# Patient Record
Sex: MALE | RURAL
[De-identification: names, ages, dates, MRNs, and addresses within clinical notes are randomized per-mention and may not be internally consistent; named-entity substitution may affect disease eponyms.]

---

## 2019-01-11 ENCOUNTER — APPOINTMENT (RX ONLY)
Dept: RURAL CLINIC 3 | Facility: CLINIC | Age: 74
Setting detail: DERMATOLOGY
End: 2019-01-11

## 2019-01-11 DIAGNOSIS — L82.1 OTHER SEBORRHEIC KERATOSIS: ICD-10-CM

## 2019-01-11 DIAGNOSIS — L85.3 XEROSIS CUTIS: ICD-10-CM

## 2019-01-11 DIAGNOSIS — L81.4 OTHER MELANIN HYPERPIGMENTATION: ICD-10-CM

## 2019-01-11 DIAGNOSIS — D22 MELANOCYTIC NEVI: ICD-10-CM

## 2019-01-11 DIAGNOSIS — L57.8 OTHER SKIN CHANGES DUE TO CHRONIC EXPOSURE TO NONIONIZING RADIATION: ICD-10-CM

## 2019-01-11 DIAGNOSIS — L57.0 ACTINIC KERATOSIS: ICD-10-CM

## 2019-01-11 PROBLEM — D22.9 MELANOCYTIC NEVI, UNSPECIFIED: Status: ACTIVE | Noted: 2019-01-11

## 2019-01-11 PROCEDURE — 17003 DESTRUCT PREMALG LES 2-14: CPT

## 2019-01-11 PROCEDURE — ? EDUCATIONAL RESOURCES PROVIDED

## 2019-01-11 PROCEDURE — 17000 DESTRUCT PREMALG LESION: CPT

## 2019-01-11 PROCEDURE — ? LIQUID NITROGEN

## 2019-01-11 PROCEDURE — 99202 OFFICE O/P NEW SF 15 MIN: CPT | Mod: 25

## 2019-01-11 PROCEDURE — ? COUNSELING

## 2019-01-11 ASSESSMENT — LOCATION DETAILED DESCRIPTION DERM
LOCATION DETAILED: LEFT DISTAL DORSAL FOREARM
LOCATION DETAILED: LEFT LATERAL MALAR CHEEK
LOCATION DETAILED: LEFT CENTRAL TEMPLE
LOCATION DETAILED: RIGHT DISTAL DORSAL FOREARM
LOCATION DETAILED: RIGHT MID TEMPLE
LOCATION DETAILED: RIGHT MID PREAURICULAR CHEEK

## 2019-01-11 ASSESSMENT — LOCATION SIMPLE DESCRIPTION DERM
LOCATION SIMPLE: RIGHT TEMPLE
LOCATION SIMPLE: LEFT FOREARM
LOCATION SIMPLE: RIGHT CHEEK
LOCATION SIMPLE: LEFT TEMPLE
LOCATION SIMPLE: RIGHT FOREARM
LOCATION SIMPLE: LEFT CHEEK

## 2019-01-11 ASSESSMENT — LOCATION ZONE DERM
LOCATION ZONE: FACE
LOCATION ZONE: ARM

## 2019-01-11 NOTE — PROCEDURE: LIQUID NITROGEN
Duration Of Freeze Thaw-Cycle (Seconds): 30
Total Number Of Aks Treated: 6
Aperture Size (Optional): C
Consent: The patient's consent was obtained including but not limited to risks of crusting, scabbing, blistering, scarring, darker or lighter pigmentary change, recurrence, incomplete removal and infection.
Detail Level: Zone
Render Post-Care Instructions In Note?: no
Number Of Freeze-Thaw Cycles: 1 freeze-thaw cycle
Post-Care Instructions: I reviewed with the patient in detail post-care instructions. Patient is to wear sunprotection, and avoid picking at any of the treated lesions. Pt may apply Vaseline to crusted or scabbing areas.

## 2019-01-11 NOTE — PROCEDURE: REASSURANCE
Additional Notes (Optional): DTO'S REGARDING FURTHER CARE
Detail Level: Detailed
Hide Additional Notes?: No
Include Location In Plan?: Yes
Detail Level: Generalized
Additional Notes (Optional): REASSURANCE REGARDING FURTHER CARE

## 2021-12-17 ENCOUNTER — APPOINTMENT (RX ONLY)
Dept: RURAL CLINIC 3 | Facility: CLINIC | Age: 76
Setting detail: DERMATOLOGY
End: 2021-12-17

## 2021-12-17 DIAGNOSIS — D22 MELANOCYTIC NEVI: ICD-10-CM

## 2021-12-17 DIAGNOSIS — D485 NEOPLASM OF UNCERTAIN BEHAVIOR OF SKIN: ICD-10-CM

## 2021-12-17 DIAGNOSIS — L82.1 OTHER SEBORRHEIC KERATOSIS: ICD-10-CM

## 2021-12-17 PROBLEM — D48.5 NEOPLASM OF UNCERTAIN BEHAVIOR OF SKIN: Status: ACTIVE | Noted: 2021-12-17

## 2021-12-17 PROBLEM — D22.5 MELANOCYTIC NEVI OF TRUNK: Status: ACTIVE | Noted: 2021-12-17

## 2021-12-17 PROCEDURE — 99212 OFFICE O/P EST SF 10 MIN: CPT | Mod: 25

## 2021-12-17 PROCEDURE — ? BIOPSY BY SHAVE METHOD

## 2021-12-17 PROCEDURE — ? EDUCATIONAL RESOURCES PROVIDED

## 2021-12-17 PROCEDURE — 11102 TANGNTL BX SKIN SINGLE LES: CPT

## 2021-12-17 PROCEDURE — ? COUNSELING

## 2021-12-17 ASSESSMENT — LOCATION ZONE DERM
LOCATION ZONE: TRUNK
LOCATION ZONE: ARM
LOCATION ZONE: FACE

## 2021-12-17 ASSESSMENT — LOCATION SIMPLE DESCRIPTION DERM
LOCATION SIMPLE: RIGHT CHEEK
LOCATION SIMPLE: LEFT FOREHEAD
LOCATION SIMPLE: RIGHT FOREARM
LOCATION SIMPLE: UPPER BACK
LOCATION SIMPLE: LEFT FOREARM
LOCATION SIMPLE: CHEST
LOCATION SIMPLE: LEFT UPPER BACK

## 2021-12-17 ASSESSMENT — LOCATION DETAILED DESCRIPTION DERM
LOCATION DETAILED: LEFT DISTAL DORSAL FOREARM
LOCATION DETAILED: MIDDLE STERNUM
LOCATION DETAILED: SUPERIOR THORACIC SPINE
LOCATION DETAILED: LEFT SUPERIOR MEDIAL UPPER BACK
LOCATION DETAILED: LEFT INFERIOR LATERAL FOREHEAD
LOCATION DETAILED: RIGHT SUPERIOR PREAURICULAR CHEEK
LOCATION DETAILED: RIGHT DISTAL DORSAL FOREARM

## 2021-12-17 NOTE — PROCEDURE: BIOPSY BY SHAVE METHOD
Detail Level: Detailed
Depth Of Biopsy: dermis
Was A Bandage Applied: Yes
Size Of Lesion In Cm: 0.9
X Size Of Lesion In Cm: 0
Biopsy Type: H and E
Biopsy Method: Personna blade
Anesthesia Type: 1% lidocaine with epinephrine
Anesthesia Volume In Cc (Will Not Render If 0): 0.5
Hemostasis: Drysol
Wound Care: Aquaphor
Dressing: Band-Aid
Destruction After The Procedure: No
Type Of Destruction Used: Curettage
Curettage Text: The wound bed was treated with curettage after the biopsy was performed.
Cryotherapy Text: The wound bed was treated with cryotherapy after the biopsy was performed.
Electrodesiccation Text: The wound bed was treated with electrodesiccation after the biopsy was performed.
Electrodesiccation And Curettage Text: The wound bed was treated with electrodesiccation and curettage after the biopsy was performed.
Silver Nitrate Text: The wound bed was treated with silver nitrate after the biopsy was performed.
Lab: 6
Consent: Written consent was obtained and risks were reviewed including but not limited to scarring, infection, bleeding, scabbing, incomplete removal, nerve damage and allergy to anesthesia.
Post-Care Instructions: I reviewed with the patient in detail post-care instructions. Patient is to keep the biopsy site dry overnight, and then apply bacitracin four times daily until healed, or if patient chooses to not wear bandage, apply bacitracin every hour.  Patient may apply very light hydrogen peroxide soaks to remove any crusting.
Notification Instructions: Patient will be notified of biopsy results as soon as results are returned to our office and reviewed by provider.
Billing Type: Third-Party Bill
Information: Selecting Yes will display possible errors in your note based on the variables you have selected. This validation is only offered as a suggestion for you. PLEASE NOTE THAT THE VALIDATION TEXT WILL BE REMOVED WHEN YOU FINALIZE YOUR NOTE. IF YOU WANT TO FAX A PRELIMINARY NOTE YOU WILL NEED TO TOGGLE THIS TO 'NO' IF YOU DO NOT WANT IT IN YOUR FAXED NOTE.

## 2022-03-18 ENCOUNTER — APPOINTMENT (RX ONLY)
Dept: RURAL CLINIC 3 | Facility: CLINIC | Age: 77
Setting detail: DERMATOLOGY
End: 2022-03-18

## 2022-03-18 DIAGNOSIS — D485 NEOPLASM OF UNCERTAIN BEHAVIOR OF SKIN: ICD-10-CM

## 2022-03-18 PROBLEM — D48.5 NEOPLASM OF UNCERTAIN BEHAVIOR OF SKIN: Status: ACTIVE | Noted: 2022-03-18

## 2022-03-18 PROCEDURE — 99212 OFFICE O/P EST SF 10 MIN: CPT

## 2022-03-18 PROCEDURE — ? TREATMENT REGIMEN

## 2022-03-18 ASSESSMENT — LOCATION DETAILED DESCRIPTION DERM: LOCATION DETAILED: RIGHT MID PREAURICULAR CHEEK

## 2022-03-18 ASSESSMENT — LOCATION ZONE DERM: LOCATION ZONE: FACE

## 2022-03-18 ASSESSMENT — LOCATION SIMPLE DESCRIPTION DERM: LOCATION SIMPLE: RIGHT CHEEK

## 2022-03-18 NOTE — PROCEDURE: TREATMENT REGIMEN
plan excision as path outlines.\\npt feeds elk-full winter commitment until May 1.
Detail Level: Detailed

## 2022-05-11 ENCOUNTER — APPOINTMENT (RX ONLY)
Dept: URBAN - NONMETROPOLITAN AREA CLINIC 31 | Facility: CLINIC | Age: 77
Setting detail: DERMATOLOGY
End: 2022-05-11

## 2022-05-11 DIAGNOSIS — D485 NEOPLASM OF UNCERTAIN BEHAVIOR OF SKIN: ICD-10-CM

## 2022-05-11 PROBLEM — D48.5 NEOPLASM OF UNCERTAIN BEHAVIOR OF SKIN: Status: ACTIVE | Noted: 2022-05-11

## 2022-05-11 PROCEDURE — ? EXCISION

## 2022-05-11 PROCEDURE — 11443 EXC FACE-MM B9+MARG 2.1-3 CM: CPT

## 2022-05-11 PROCEDURE — 12053 INTMD RPR FACE/MM 5.1-7.5 CM: CPT

## 2022-05-11 ASSESSMENT — LOCATION SIMPLE DESCRIPTION DERM: LOCATION SIMPLE: RIGHT CHEEK

## 2022-05-11 ASSESSMENT — LOCATION DETAILED DESCRIPTION DERM: LOCATION DETAILED: RIGHT SUPERIOR LATERAL MALAR CHEEK

## 2022-05-11 ASSESSMENT — LOCATION ZONE DERM: LOCATION ZONE: FACE

## 2022-05-11 NOTE — PROCEDURE: EXCISION
Phone Number Called: [phone] (home)       Call outcome: left message for patient to call back regarding message below    Message: Lm for patient to call for results. (1st attempt)    Bilobed Flap Text: The defect edges were debeveled with a #15 scalpel blade.  Given the location of the defect and the proximity to free margins a bilobe flap was deemed most appropriate.  Using a sterile surgical marker, an appropriate bilobe flap drawn around the defect.    The area thus outlined was incised deep to adipose tissue with a #15 scalpel blade.  The skin margins were undermined to an appropriate distance in all directions utilizing iris scissors.

## 2022-05-11 NOTE — PROCEDURE: EXCISION
Well , 12 Months Old  Well-child exams are recommended visits with a health care provider to track your child's growth and development at certain ages. This sheet tells you what to expect during this visit.  Recommended immunizations  · Hepatitis B vaccine. The third dose of a 3-dose series should be given at age 6-18 months. The third dose should be given at least 16 weeks after the first dose and at least 8 weeks after the second dose.  · Diphtheria and tetanus toxoids and acellular pertussis (DTaP) vaccine. Your child may get doses of this vaccine if needed to catch up on missed doses.  · Haemophilus influenzae type b (Hib) booster. One booster dose should be given at age 12-15 months. This may be the third dose or fourth dose of the series, depending on the type of vaccine.  · Pneumococcal conjugate (PCV13) vaccine. The fourth dose of a 4-dose series should be given at age 12-15 months. The fourth dose should be given 8 weeks after the third dose.  ? The fourth dose is needed for children age 12-59 months who received 3 doses before their first birthday. This dose is also needed for high-risk children who received 3 doses at any age.  ? If your child is on a delayed vaccine schedule in which the first dose was given at age 7 months or later, your child may receive a final dose at this visit.  · Inactivated poliovirus vaccine. The third dose of a 4-dose series should be given at age 6-18 months. The third dose should be given at least 4 weeks after the second dose.  · Influenza vaccine (flu shot). Starting at age 6 months, your child should be given the flu shot every year. Children between the ages of 6 months and 8 years who get the flu shot for the first time should be given a second dose at least 4 weeks after the first dose. After that, only a single yearly (annual) dose is recommended.  · Measles, mumps, and rubella (MMR) vaccine. The first dose of a 2-dose series should be given at age 12-15  months. The second dose of the series will be given at 4-6 years of age. If your child had the MMR vaccine before the age of 12 months due to travel outside of the country, he or she will still receive 2 more doses of the vaccine.  · Varicella vaccine. The first dose of a 2-dose series should be given at age 12-15 months. The second dose of the series will be given at 4-6 years of age.  · Hepatitis A vaccine. A 2-dose series should be given at age 12-23 months. The second dose should be given 6-18 months after the first dose. If your child has received only one dose of the vaccine by age 24 months, he or she should get a second dose 6-18 months after the first dose.  · Meningococcal conjugate vaccine. Children who have certain high-risk conditions, are present during an outbreak, or are traveling to a country with a high rate of meningitis should receive this vaccine.  Your child may receive vaccines as individual doses or as more than one vaccine together in one shot (combination vaccines). Talk with your child's health care provider about the risks and benefits of combination vaccines.  Testing  Vision  · Your child's eyes will be assessed for normal structure (anatomy) and function (physiology).  Other tests  · Your child's health care provider will screen for low red blood cell count (anemia) by checking protein in the red blood cells (hemoglobin) or the amount of red blood cells in a small sample of blood (hematocrit).  · Your baby may be screened for hearing problems, lead poisoning, or tuberculosis (TB), depending on risk factors.  · Screening for signs of autism spectrum disorder (ASD) at this age is also recommended. Signs that health care providers may look for include:  ? Limited eye contact with caregivers.  ? No response from your child when his or her name is called.  ? Repetitive patterns of behavior.  General instructions  Oral health    · Brush your child's teeth after meals and before bedtime. Use  a small amount of non-fluoride toothpaste.  · Take your child to a dentist to discuss oral health.  · Give fluoride supplements or apply fluoride varnish to your child's teeth as told by your child's health care provider.  · Provide all beverages in a cup and not in a bottle. Using a cup helps to prevent tooth decay.  Skin care  · To prevent diaper rash, keep your child clean and dry. You may use over-the-counter diaper creams and ointments if the diaper area becomes irritated. Avoid diaper wipes that contain alcohol or irritating substances, such as fragrances.  · When changing a girl's diaper, wipe her bottom from front to back to prevent a urinary tract infection.  Sleep  · At this age, children typically sleep 12 or more hours a day and generally sleep through the night. They may wake up and cry from time to time.  · Your child may start taking one nap a day in the afternoon. Let your child's morning nap naturally fade from your child's routine.  · Keep naptime and bedtime routines consistent.  Medicines  · Do not give your child medicines unless your health care provider says it is okay.  Contact a health care provider if:  · Your child shows any signs of illness.  · Your child has a fever of 100.4°F (38°C) or higher as taken by a rectal thermometer.  What's next?  Your next visit will take place when your child is 15 months old.  Summary  · Your child may receive immunizations based on the immunization schedule your health care provider recommends.  · Your baby may be screened for hearing problems, lead poisoning, or tuberculosis (TB), depending on his or her risk factors.  · Your child may start taking one nap a day in the afternoon. Let your child's morning nap naturally fade from your child's routine.  · Brush your child's teeth after meals and before bedtime. Use a small amount of non-fluoride toothpaste.  This information is not intended to replace advice given to you by your health care provider. Make  sure you discuss any questions you have with your health care provider.  Document Released: 01/07/2008 Document Revised: 2020 Document Reviewed: 09/13/2019  Elsevier Patient Education © 2020 [employer]      Sample Menu for an 8 to 12 Month Old  Now that your baby is eating solid foods, planning meals can be more challenging. At this age, your baby needs between 750 and 900 calories each day, about 400 to 500 of which should come from breast milk or formula (approximately 24 oz. [720 mL] a day). See the following sample menu ideas for an eight- to twelve-month-old.   1 cup = 8 ounces [240 mL]             4 ounces = 120 mL  6 ounces = 180 mL?           Breakfast  · ¼ - ½ cup cereal or mashed egg  · ¼ - ½ cup fruit, diced (if your child is self- feeding)  · 4-6 oz. formula or breastmilk  Snack?  · 4-6 oz. breastmilk or formula or water  · ¼ cup diced cheese or cooked vegetables  Lunch  · ¼ - ½ cup yogurt or cottage cheese or meat  · ¼ - ½ cup yellow or orange vegetables  · 4-6 oz. formula or breastmilk  Snack  · 1 teething biscuit or cracker  · ¼ cup yogurt or diced (if child is self-feeding) fruit Water  Dinner  · ¼ cup diced poultry, meat, or tofu  · ¼ - ½ cup green vegetables  · ¼ cup noodles, pasta, rice, or potato  · ¼ cup fruit  · 4-6 oz. formula or breastmilk  Before Bedtime  · 6-8 oz. formula or breastmilk or water (If formula or breastmilk, follow with water or brush teeth afterward).       ?    Last Updated   12/8/2015  [employer]   Caring for Your Baby and Young Child: Birth to Age 5, 6th Edition (Copyright © 2015 American Academy of Pediatrics)   There may be variations in treatment that your pediatrician may recommend based on individual facts and circumstances.      Oral Health Guidance for 12 Month Old Child   • Visit the dentist by 12 months or after first tooth.   • Brush teeth twice a day with smear of fluoridated toothpaste, soft toothbrush.   • If still using bottle, offer only water.   •  Fluoride varnish applied at least 2 times per year (4 times per year for high risk children) in the medical or dental office.    Alar Island Pedicle Flap Text: The defect edges were debeveled with a #15 scalpel blade.  Given the location of the defect, shape of the defect and the proximity to the alar rim an island pedicle advancement flap was deemed most appropriate.  Using a sterile surgical marker, an appropriate advancement flap was drawn incorporating the defect, outlining the appropriate donor tissue and placing the expected incisions within the nasal ala running parallel to the alar rim. The area thus outlined was incised with a #15 scalpel blade.  The skin margins were undermined minimally to an appropriate distance in all directions around the primary defect and laterally outward around the island pedicle utilizing iris scissors.  There was minimal undermining beneath the pedicle flap.

## 2022-08-12 ENCOUNTER — APPOINTMENT (RX ONLY)
Dept: RURAL CLINIC 3 | Facility: CLINIC | Age: 77
Setting detail: DERMATOLOGY
End: 2022-08-12

## 2022-08-12 DIAGNOSIS — D22 MELANOCYTIC NEVI: ICD-10-CM

## 2022-08-12 DIAGNOSIS — L82.1 OTHER SEBORRHEIC KERATOSIS: ICD-10-CM

## 2022-08-12 DIAGNOSIS — L57.0 ACTINIC KERATOSIS: ICD-10-CM | Status: INADEQUATELY CONTROLLED

## 2022-08-12 DIAGNOSIS — L85.3 XEROSIS CUTIS: ICD-10-CM

## 2022-08-12 DIAGNOSIS — L57.8 OTHER SKIN CHANGES DUE TO CHRONIC EXPOSURE TO NONIONIZING RADIATION: ICD-10-CM | Status: INADEQUATELY CONTROLLED

## 2022-08-12 DIAGNOSIS — L81.4 OTHER MELANIN HYPERPIGMENTATION: ICD-10-CM

## 2022-08-12 PROBLEM — D22.5 MELANOCYTIC NEVI OF TRUNK: Status: ACTIVE | Noted: 2022-08-12

## 2022-08-12 PROCEDURE — ? LIQUID NITROGEN

## 2022-08-12 PROCEDURE — ? SUNSCREEN TREATMENT REGIMEN

## 2022-08-12 PROCEDURE — ? EDUCATIONAL RESOURCES PROVIDED

## 2022-08-12 PROCEDURE — 17003 DESTRUCT PREMALG LES 2-14: CPT

## 2022-08-12 PROCEDURE — 17000 DESTRUCT PREMALG LESION: CPT

## 2022-08-12 PROCEDURE — 99213 OFFICE O/P EST LOW 20 MIN: CPT | Mod: 25

## 2022-08-12 PROCEDURE — ? COUNSELING

## 2022-08-12 PROCEDURE — ? TREATMENT REGIMEN

## 2022-08-12 PROCEDURE — ? FULL BODY SKIN EXAM

## 2022-08-12 ASSESSMENT — LOCATION DETAILED DESCRIPTION DERM
LOCATION DETAILED: RIGHT DORSAL FOOT
LOCATION DETAILED: LEFT MEDIAL ZYGOMA
LOCATION DETAILED: RIGHT CENTRAL TEMPLE
LOCATION DETAILED: RIGHT DISTAL DORSAL FOREARM
LOCATION DETAILED: LEFT INFERIOR LATERAL FOREHEAD
LOCATION DETAILED: MIDDLE STERNUM
LOCATION DETAILED: SUPERIOR THORACIC SPINE
LOCATION DETAILED: RIGHT SUPERIOR FOREHEAD
LOCATION DETAILED: LEFT DORSAL FOOT
LOCATION DETAILED: LEFT SUPERIOR MEDIAL UPPER BACK
LOCATION DETAILED: LEFT INFERIOR LATERAL MALAR CHEEK
LOCATION DETAILED: RIGHT INFERIOR CENTRAL MALAR CHEEK
LOCATION DETAILED: LEFT LATERAL ZYGOMA
LOCATION DETAILED: LEFT DISTAL DORSAL FOREARM

## 2022-08-12 ASSESSMENT — LOCATION SIMPLE DESCRIPTION DERM
LOCATION SIMPLE: LEFT CHEEK
LOCATION SIMPLE: LEFT FOOT
LOCATION SIMPLE: RIGHT FOREARM
LOCATION SIMPLE: RIGHT CHEEK
LOCATION SIMPLE: RIGHT TEMPLE
LOCATION SIMPLE: LEFT UPPER BACK
LOCATION SIMPLE: LEFT FOREARM
LOCATION SIMPLE: LEFT ZYGOMA
LOCATION SIMPLE: RIGHT FOOT
LOCATION SIMPLE: UPPER BACK
LOCATION SIMPLE: LEFT FOREHEAD
LOCATION SIMPLE: RIGHT FOREHEAD
LOCATION SIMPLE: CHEST

## 2022-08-12 ASSESSMENT — LOCATION ZONE DERM
LOCATION ZONE: FACE
LOCATION ZONE: FEET
LOCATION ZONE: ARM
LOCATION ZONE: TRUNK

## 2022-08-12 NOTE — PROCEDURE: LIQUID NITROGEN
Consent: The patient's consent was obtained including but not limited to risks of crusting, scabbing, blistering, scarring, darker or lighter pigmentary change, recurrence, incomplete removal and infection.
Show Aperture Variable?: Yes
Detail Level: Detailed
Duration Of Freeze Thaw-Cycle (Seconds): 30
Post-Care Instructions: I reviewed with the patient in detail post-care instructions. Patient is to wear sunprotection, and avoid picking at any of the treated lesions. Pt may apply Vaseline to crusted or scabbing areas.
Render Note In Bullet Format When Appropriate: No
Number Of Freeze-Thaw Cycles: 2 freeze-thaw cycles

## 2022-10-14 NOTE — PROCEDURE: EXCISION
Per Dr. Sun she will be sending them in today.    Double O-Z Plasty Text: The defect edges were debeveled with a #15 scalpel blade.  Given the location of the defect, shape of the defect and the proximity to free margins a Double O-Z plasty (double transposition flap) was deemed most appropriate.  Using a sterile surgical marker, the appropriate transposition flaps were drawn incorporating the defect and placing the expected incisions within the relaxed skin tension lines where possible. The area thus outlined was incised deep to adipose tissue with a #15 scalpel blade.  The skin margins were undermined to an appropriate distance in all directions utilizing iris scissors.  Hemostasis was achieved with electrocautery.  The flaps were then transposed into place, one clockwise and the other counterclockwise, and anchored with interrupted buried subcutaneous sutures.

## 2024-05-25 NOTE — PROCEDURE: EXCISION
verbal cues Xenograft Text: The defect edges were debeveled with a #15 scalpel blade.  Given the location of the defect, shape of the defect and the proximity to free margins a xenograft was deemed most appropriate.  The graft was then trimmed to fit the size of the defect.  The graft was then placed in the primary defect and oriented appropriately.